# Patient Record
Sex: FEMALE | Race: WHITE
[De-identification: names, ages, dates, MRNs, and addresses within clinical notes are randomized per-mention and may not be internally consistent; named-entity substitution may affect disease eponyms.]

---

## 2018-06-07 ENCOUNTER — HOSPITAL ENCOUNTER (EMERGENCY)
Dept: HOSPITAL 74 - JER | Age: 43
Discharge: HOME | End: 2018-06-07
Payer: COMMERCIAL

## 2018-06-07 VITALS — DIASTOLIC BLOOD PRESSURE: 71 MMHG | SYSTOLIC BLOOD PRESSURE: 122 MMHG | HEART RATE: 78 BPM | TEMPERATURE: 98.3 F

## 2018-06-07 VITALS — BODY MASS INDEX: 26 KG/M2

## 2018-06-07 DIAGNOSIS — O26.891: Primary | ICD-10-CM

## 2018-06-07 DIAGNOSIS — Z3A.01: ICD-10-CM

## 2018-06-07 DIAGNOSIS — O20.8: ICD-10-CM

## 2018-06-07 LAB
ALBUMIN SERPL-MCNC: 4.1 G/DL (ref 3.4–5)
ALP SERPL-CCNC: 92 U/L (ref 45–117)
ALT SERPL-CCNC: 24 U/L (ref 12–78)
ANION GAP SERPL CALC-SCNC: 7 MMOL/L (ref 8–16)
APPEARANCE UR: (no result)
AST SERPL-CCNC: 15 U/L (ref 15–37)
BASOPHILS # BLD: 0.5 % (ref 0–2)
BILIRUB SERPL-MCNC: 0.3 MG/DL (ref 0.2–1)
BILIRUB UR STRIP.AUTO-MCNC: NEGATIVE MG/DL
BUN SERPL-MCNC: 10 MG/DL (ref 7–18)
CALCIUM SERPL-MCNC: 9.7 MG/DL (ref 8.5–10.1)
CHLORIDE SERPL-SCNC: 104 MMOL/L (ref 98–107)
CO2 SERPL-SCNC: 24 MMOL/L (ref 21–32)
COLOR UR: (no result)
CREAT SERPL-MCNC: 0.6 MG/DL (ref 0.55–1.02)
DEPRECATED RDW RBC AUTO: 13.8 % (ref 11.6–15.6)
EOSINOPHIL # BLD: 1.5 % (ref 0–4.5)
EPITH CASTS URNS QL MICRO: (no result) /HPF
GLUCOSE SERPL-MCNC: 87 MG/DL (ref 74–106)
HCT VFR BLD CALC: 36.9 % (ref 32.4–45.2)
HGB BLD-MCNC: 12.4 GM/DL (ref 10.7–15.3)
KETONES UR QL STRIP: NEGATIVE
LEUKOCYTE ESTERASE UR QL STRIP.AUTO: NEGATIVE
LYMPHOCYTES # BLD: 30.2 % (ref 8–40)
MCH RBC QN AUTO: 29.6 PG (ref 25.7–33.7)
MCHC RBC AUTO-ENTMCNC: 33.7 G/DL (ref 32–36)
MCV RBC: 88 FL (ref 80–96)
MONOCYTES # BLD AUTO: 6.2 % (ref 3.8–10.2)
NEUTROPHILS # BLD: 61.6 % (ref 42.8–82.8)
NITRITE UR QL STRIP: NEGATIVE
PH UR: 6 [PH] (ref 5–8)
PLATELET # BLD AUTO: 223 K/MM3 (ref 134–434)
PMV BLD: 9.1 FL (ref 7.5–11.1)
POTASSIUM SERPLBLD-SCNC: 4 MMOL/L (ref 3.5–5.1)
PROT SERPL-MCNC: 7.6 G/DL (ref 6.4–8.2)
PROT UR QL STRIP: NEGATIVE
PROT UR QL STRIP: NEGATIVE
RBC # BLD AUTO: 4.19 M/MM3 (ref 3.6–5.2)
RBC # UR STRIP: (no result) /UL
SODIUM SERPL-SCNC: 135 MMOL/L (ref 136–145)
SP GR UR: 1.01 (ref 1–1.03)
UROBILINOGEN UR STRIP-MCNC: NEGATIVE MG/DL (ref 0.2–1)
WBC # BLD AUTO: 5.6 K/MM3 (ref 4–10)

## 2018-06-07 NOTE — PDOC
History of Present Illness





- General


Chief Complaint: Vaginal Bleeding


Stated Complaint: BLEEDING (5 WKS PREGNANT)


Time Seen by Provider: 18 11:39





- History of Present Illness


Initial Comments: 





18 11:57


Ms. Hensley is a  44 yo female at approximately 5 weeks gestation (LMP 

) who presents for evaluation of a small amount of blood noted today 

while wiping. She had previously been evaluated 2 days ago at the [facility] 

[facility] clinic and was told to follow-up as needed for any blood or pain. She is 

unsure if they were able to confirm IUP. Denies any current pain however is 

nervous and would like to be evaluated.





The patient denies chest pain, shortness of breath, headache and dizziness. 

Denies fever, chills, nausea, vomit, diarrhea and constipation. Denies dysuria, 

frequency, urgency and hematuria. 


Allergies: NKDA





Past History





- Past Medical History


Allergies/Adverse Reactions: 


 Allergies











Allergy/AdvReac Type Severity Reaction Status Date / Time


 


SEAFOOD Allergy  Difficulty Uncoded 18 11:29





   Breathing  











Home Medications: 


Ambulatory Orders





Prenatal Vit No.130/Iron/Folic [Prenatal Vitamins] 1 each PO DAILY 17 








Asthma: No


Cancer: No


Cardiac Disorders: No


COPD: No


DVT: No


Diabetes: No


HTN: No


Seizures: No


Thyroid Disease: No





- Reproductive History


 (#): 3


Para: 1





- Suicide/Smoking/Psychosocial Hx


Smoking Status: No


Smoking History: Never smoked


Have you smoked in the past 12 months: No


Number of Cigarettes Smoked Daily: 0


Information on smoking cessation initiated: No


Hx Alcohol Use: No


Drug/Substance Use Hx: No


Substance Use Type: None


Hx Substance Use Treatment: No





**Review of Systems





- Review of Systems


Comments:: 





18 12:02


GENERAL/CONSTITUTIONAL: No fever or chills. No weakness.


HEAD, EYES, EARS, NOSE AND THROAT: No change in vision. No ear pain or 

discharge. No sore throat.


CARDIOVASCULAR: No chest pain or shortness of breath


RESPIRATORY: No cough, wheezing, or hemoptysis.


GASTROINTESTINAL: No nausea, vomiting, diarrhea or constipation.


GENITOURINARY: No dysuria, frequency, or change in urination.


MUSCULOSKELETAL: No joint or muscle swelling or pain. No neck or back pain.


SKIN: No rash


NEUROLOGIC: No headache, vertigo, loss of consciousness, or change in strength/

sensation.


ENDOCRINE: No increased thirst. No abnormal weight change


HEMATOLOGIC/LYMPHATIC: No anemia, easy bleeding, or history of blood clots.


ALLERGIC/IMMUNOLOGIC: No hives or skin allergy.





*Physical Exam





- Vital Signs


 Last Vital Signs











Temp Pulse Resp BP Pulse Ox


 


 98.6 F   79   18   125/51   100 


 


 18 11:29  18 11:29  18 11:29  18 11:29  18 11:29














- Physical Exam


Comments: 





18 12:02


GENERAL: Awake, alert, and fully oriented, in no acute distress


HEAD: No signs of trauma, normocephalic, atraumatic 


EYES: PERRLA, EOMI, sclera anicteric, conjunctiva clear


ENT: Auricles normal inspection, hearing grossly normal, nares patent, 

oropharynx clear without


exudates. Moist mucosa


NECK: Normal ROM, supple, no lymphadenopathy, JVD, or masses


LUNGS: No distress, speaks full sentences, clear to auscultation bilaterally 


HEART: Regular rate and rhythm, normal S1 and S2, no murmurs, rubs or gallops, 

peripheral pulses normal and equal bilaterally. 


ABDOMEN: Soft, nontender, normoactive bowel sounds. No guarding, no rebound. No 

masses


EXTREMITIES: Normal inspection, Normal range of motion, no edema. No clubbing 

or cyanosis. 


NEUROLOGICAL: Cranial nerves II through XII grossly intact. Normal speech, 

normal gait, no focal sensorimotor deficits 


SKIN: Warm, Dry, normal turgor, no rashes or lesions noted. 


: No CMT, no adnexal tenderness. Os closed, no blood noted.








ED Treatment Course





- LABORATORY


CBC & Chemistry Diagram: 


 18 12:05





 18 12:05





Medical Decision Making





- Medical Decision Making





18 15:11


Ms. Hensley is a 44 yo female w/ pmh as described who presents for evaluation 

of spotting noted in pregnancy. Patient exam non-concerning as above. 

Transvaginal US sent to evaluate for IUP. 





18 15:12


US noted irregular intrauterine gestational sac containing probably fetl pole w/

out definite yolk sac. Unable to identify fetal heart rate. Findings suspicious 

for failed early pregnancy. Re-evaluation recommended in 7-13 days. Beta as 

below. Patient Rh + on blood type so no rhogam administered. Discharging 

patient w/ instructions to f/u with OB/GYN for further evaluation. Patient 

verbalized understanding and agreement and will comply.








 Laboratory Results - last 24 hr











  18





  12:05 12:05 12:05


 


WBC  5.6  D  


 


RBC  4.19  D  


 


Hgb  12.4  D  


 


Hct  36.9  D  


 


MCV  88.0  


 


MCH  29.6  


 


MCHC  33.7  


 


RDW  13.8  D  


 


Plt Count  223  D  


 


MPV  9.1  


 


Absolute Neuts (auto)  3.5  


 


Neutrophils %  61.6  D  


 


Lymphocytes %  30.2  D  


 


Monocytes %  6.2  


 


Eosinophils %  1.5  D  


 


Basophils %  0.5  


 


Nucleated RBC %  0  


 


Sodium   135 L 


 


Potassium   4.0 


 


Chloride   104 


 


Carbon Dioxide   24 


 


Anion Gap   7 L 


 


BUN   10 


 


Creatinine   0.6 


 


Creat Clearance w eGFR   > 60 


 


Random Glucose   87 


 


Calcium   9.7 


 


Total Bilirubin   0.3  D 


 


AST   15 


 


ALT   24 


 


Alkaline Phosphatase   92 


 


Total Protein   7.6 


 


Albumin   4.1 


 


Beta HCG, Quant   6875.7 


 


Urine Color   


 


Urine Appearance   


 


Urine pH   


 


Ur Specific Gravity   


 


Urine Protein   


 


Urine Glucose (UA)   


 


Urine Ketones   


 


Urine Blood   


 


Urine Nitrite   


 


Urine Bilirubin   


 


Urine Urobilinogen   


 


Ur Leukocyte Esterase   


 


Urine WBC (Auto)   


 


Urine RBC (Auto)   


 


Ur Epithelial Cells   


 


Blood Type    A POSITIVE


 


Antibody Screen    Negative














  18





  12:30


 


WBC 


 


RBC 


 


Hgb 


 


Hct 


 


MCV 


 


MCH 


 


MCHC 


 


RDW 


 


Plt Count 


 


MPV 


 


Absolute Neuts (auto) 


 


Neutrophils % 


 


Lymphocytes % 


 


Monocytes % 


 


Eosinophils % 


 


Basophils % 


 


Nucleated RBC % 


 


Sodium 


 


Potassium 


 


Chloride 


 


Carbon Dioxide 


 


Anion Gap 


 


BUN 


 


Creatinine 


 


Creat Clearance w eGFR 


 


Random Glucose 


 


Calcium 


 


Total Bilirubin 


 


AST 


 


ALT 


 


Alkaline Phosphatase 


 


Total Protein 


 


Albumin 


 


Beta HCG, Quant 


 


Urine Color  Ltyellow


 


Urine Appearance  Slcloudy


 


Urine pH  6.0


 


Ur Specific Gravity  1.009


 


Urine Protein  Negative


 


Urine Glucose (UA)  Negative


 


Urine Ketones  Negative


 


Urine Blood  2+ H


 


Urine Nitrite  Negative


 


Urine Bilirubin  Negative


 


Urine Urobilinogen  Negative


 


Ur Leukocyte Esterase  Negative


 


Urine WBC (Auto)  1


 


Urine RBC (Auto)  1


 


Ur Epithelial Cells  Rare


 


Blood Type 


 


Antibody Screen 














*DC/Admit/Observation/Transfer


Diagnosis at time of Disposition: 


 Bleeding in early pregnancy








- Discharge Dispostion


Disposition: HOME





- Referrals


Referrals: 


Oswaldo Betts MD [Primary Care Provider] - 


Madison Ramos DO [Staff Physician] - 





- Patient Instructions


Printed Discharge Instructions:  [facility] for Vaginal Bleeding During Pregnancy


Additional Instructions: 


Please follow-up with OB/GYN as discussed. Return to ER if any increase in 

bleeding, pain, fever, discharge, or other concerning symptoms.





- Post Discharge Activity

## 2018-06-07 NOTE — PDOC
Attending Attestation





- Resident


Resident Name: Hayden Olsen





- ED Attending Attestation


I have performed the following: I have examined & evaluated the patient, The 

case was reviewed & discussed with the resident, I agree w/resident's findings 

& plan, Exceptions are as noted





- HPI


HPI: 





18 13:07


43y F  no other pmhx presents with complaint of vaginal spotting, currently 

at 5 weeks gestatioin without abdominal pain, f/c, n/v. back pain





exam noted for soft nontender abdomen





will obtain Beta, tvus











- Physicial Exam


PE: 





18 15:10


see above





- Medical Decision Making





18 15:09


beta at 6k


US noted for gestational sack with probable fetal pole with no definite yolk 

sack or fetal heart rate. possible aerly pregnancy


will have pt fu with dr. serrano

## 2018-06-09 ENCOUNTER — HOSPITAL ENCOUNTER (EMERGENCY)
Dept: HOSPITAL 74 - JER | Age: 43
Discharge: HOME | End: 2018-06-09
Payer: COMMERCIAL

## 2018-06-09 VITALS — TEMPERATURE: 98 F

## 2018-06-09 VITALS — SYSTOLIC BLOOD PRESSURE: 121 MMHG | DIASTOLIC BLOOD PRESSURE: 73 MMHG | HEART RATE: 84 BPM

## 2018-06-09 VITALS — BODY MASS INDEX: 26 KG/M2

## 2018-06-09 DIAGNOSIS — O20.0: ICD-10-CM

## 2018-06-09 DIAGNOSIS — O26.891: Primary | ICD-10-CM

## 2018-06-09 DIAGNOSIS — Z3A.00: ICD-10-CM

## 2018-06-09 LAB
ALBUMIN SERPL-MCNC: 4 G/DL (ref 3.4–5)
ALP SERPL-CCNC: 88 U/L (ref 45–117)
ALT SERPL-CCNC: 24 U/L (ref 12–78)
ANION GAP SERPL CALC-SCNC: 8 MMOL/L (ref 8–16)
APPEARANCE UR: (no result)
AST SERPL-CCNC: 16 U/L (ref 15–37)
BASOPHILS # BLD: 0.5 % (ref 0–2)
BILIRUB SERPL-MCNC: 0.3 MG/DL (ref 0.2–1)
BILIRUB UR STRIP.AUTO-MCNC: NEGATIVE MG/DL
BUN SERPL-MCNC: 11 MG/DL (ref 7–18)
CALCIUM SERPL-MCNC: 9.4 MG/DL (ref 8.5–10.1)
CHLORIDE SERPL-SCNC: 104 MMOL/L (ref 98–107)
CO2 SERPL-SCNC: 25 MMOL/L (ref 21–32)
COLOR UR: (no result)
CREAT SERPL-MCNC: 0.8 MG/DL (ref 0.55–1.02)
DEPRECATED RDW RBC AUTO: 13.4 % (ref 11.6–15.6)
EOSINOPHIL # BLD: 1.3 % (ref 0–4.5)
EPITH CASTS URNS QL MICRO: (no result) /HPF
GLUCOSE SERPL-MCNC: 92 MG/DL (ref 74–106)
HCG UR QL: POSITIVE
HCT VFR BLD CALC: 37 % (ref 32.4–45.2)
HGB BLD-MCNC: 12.4 GM/DL (ref 10.7–15.3)
KETONES UR QL STRIP: NEGATIVE
LEUKOCYTE ESTERASE UR QL STRIP.AUTO: NEGATIVE
LYMPHOCYTES # BLD: 24.7 % (ref 8–40)
MCH RBC QN AUTO: 29.7 PG (ref 25.7–33.7)
MCHC RBC AUTO-ENTMCNC: 33.6 G/DL (ref 32–36)
MCV RBC: 88.2 FL (ref 80–96)
MONOCYTES # BLD AUTO: 6.2 % (ref 3.8–10.2)
MUCOUS THREADS URNS QL MICRO: (no result)
NEUTROPHILS # BLD: 67.3 % (ref 42.8–82.8)
NITRITE UR QL STRIP: NEGATIVE
PH UR: 6 [PH] (ref 5–8)
PLATELET # BLD AUTO: 213 K/MM3 (ref 134–434)
PMV BLD: 9.4 FL (ref 7.5–11.1)
POTASSIUM SERPLBLD-SCNC: 4.1 MMOL/L (ref 3.5–5.1)
PROT SERPL-MCNC: 7.6 G/DL (ref 6.4–8.2)
PROT UR QL STRIP: (no result)
PROT UR QL STRIP: NEGATIVE
RBC # BLD AUTO: 4.19 M/MM3 (ref 3.6–5.2)
RBC # UR STRIP: (no result) /UL
SODIUM SERPL-SCNC: 137 MMOL/L (ref 136–145)
SP GR UR: 1.01 (ref 1–1.03)
UROBILINOGEN UR STRIP-MCNC: NEGATIVE MG/DL (ref 0.2–1)
WBC # BLD AUTO: 7.4 K/MM3 (ref 4–10)

## 2018-06-09 PROCEDURE — 3E0337Z INTRODUCTION OF ELECTROLYTIC AND WATER BALANCE SUBSTANCE INTO PERIPHERAL VEIN, PERCUTANEOUS APPROACH: ICD-10-PCS

## 2018-06-09 NOTE — PDOC
*Physical Exam





- Vital Signs


 Last Vital Signs











Temp Pulse Resp BP Pulse Ox


 


 98 F   83   18   122/56   99 


 


 06/09/18 11:00  06/09/18 11:00  06/09/18 11:00  06/09/18 11:00  06/09/18 11:00














ED Treatment Course





- LABORATORY


CBC & Chemistry Diagram: 


 06/09/18 11:27





 06/09/18 12:05





Medical Decision Making





- Medical Decision Making





06/09/18 11:56


42 yo F p/w lower abd pain and bleeding 


Recent US demonstrates no IUP, bHCG 6000. 


Patient states was told to return in 2 days. 





Pending repeat labs


 Laboratory Tests











  06/09/18





  12:05


 


Beta HCG, Quant  3663.3











And US





Pt seen by Midlevel Provider under my direct supervision


Pt interviewed and examined


Ancillary studies reviewed





I agree with plan as outlined by Midlevel Provider


06/09/18 15:59





06/09/18 16:05








*DC/Admit/Observation/Transfer





- Referrals


Referrals: 


Oswaldo Betts MD [Primary Care Provider] - 





- Patient Instructions





- Post Discharge Activity

## 2018-06-09 NOTE — PDOC
History of Present Illness





- General


Chief Complaint: Vaginal Bleeding


Stated Complaint: VAGINAL BLEEDING, PAIN


Time Seen by Provider: 18 11:08


History Source: Patient


Exam Limitations: No Limitations





- History of Present Illness


Travel History: No


Initial Comments: 





18 13:00


43-year-old female presents to the ED with complaints of lower abdominal pain 

along with vaginal bleeding which has increased since yesterday. Patient was 

seen here 2 days ago and very to confirmation of pregnancy as per request from 

patient's GYN woman to woman. Patient had an ultrasound which showed no IUP and 

had a beta count of 6000. Patient states was told to return in 2 days. Patient 

states no low back pain passage of large clots, urinary complaints, or fever.


Timing/Duration: reports: getting worse


Quality: reports: mild, cramping


Abdominal Pain Onset Location: reports: suprapubic


Pain Radiation: reports: no radiation


Activities at Onset: reports: none


Aggravating Factors: improves with: None


Alleviating Factors: improves with: None





Past History





- Travel


Traveled outside of the country in the last 30 days: No





- Past Medical History


Allergies/Adverse Reactions: 


 Allergies











Allergy/AdvReac Type Severity Reaction Status Date / Time


 


No Known Drug Allergies Allergy   Verified 18 14:24


 


SEAFOOD Allergy  Difficulty Uncoded 18 14:11





   Breathing  











Home Medications: 


Ambulatory Orders





Prenatal Vit No.130/Iron/Folic [Prenatal Vitamins] 1 each PO DAILY 17 


Acetaminophen [Tylenol -] 1,000 mg PO TID PRN #30 tablet 18 








Asthma: No


Cancer: No


Cardiac Disorders: No


COPD: No


DVT: No


Diabetes: No


HTN: No


Seizures: No


Thyroid Disease: No





- Reproductive History


 (#): 3


Para: 1


Spontaneous : 0





- Suicide/Smoking/Psychosocial Hx


Smoking Status: No


Smoking History: Never smoked


Have you smoked in the past 12 months: No


Number of Cigarettes Smoked Daily: 0


Hx Alcohol Use: No


Drug/Substance Use Hx: No


Substance Use Type: None


Hx Substance Use Treatment: No


Patient Lives Alone: No


Lives with/in: spouse/SO





**Review of Systems





- Review of Systems


Able to Perform ROS?: No


Constitutional: No: Symptoms Reported


HEENTM: No: Symptoms Reported


Respiratory: No: Symptoms reported


Cardiac (ROS): No: Symptoms Reported


ABD/GI: Yes: Abdominal cramping


: Yes: Discharge


Musculoskeletal: No: Symptoms Reported


Integumentary: No: Symptoms Reported


Neurological: No: Symptoms reported


Endocrine: No: Symptoms Reported


Hematologic/Lymphatic: No: Symptoms Reported





*Physical Exam





- Vital Signs


 Last Vital Signs











Temp Pulse Resp BP Pulse Ox


 


 98 F   83   18   122/56   99 


 


 18 11:00  18 11:00  18 11:00  18 11:00  18 11:00














- Physical Exam


General Appearance: Yes: Nourished, Appropriately Dressed.  No: Apparent 

Distress


HEENT: negative: Pale Conjunctivae


Neck: positive: Normal Thyroid, Supple


Respiratory/Chest: positive: Lungs Clear, Normal Breath Sounds.  negative: 

Respiratory Distress, Accessory Muscle Use


Cardiovascular: positive: Regular Rhythm, Regular Rate.  negative: Murmur


Female Pelvic Exam: positive: cervical os closed, vaginal bleeding (bright red 

moderate amount)


Gastrointestinal/Abdominal: positive: Normal Bowel Sounds, Soft, Tenderness (

suprapubic).  negative: Distended


Extremity: positive: Normal Capillary Refill.  negative: Pedal Edema


Integumentary: positive: Normal Color, Warm, Moist


Neurologic: positive: Motor Strength 5/5 (ambulatory)





ED Treatment Course





- LABORATORY


CBC & Chemistry Diagram: 


 18 11:27





 18 12:05





- ADDITIONAL ORDERS


Additional order review: 


 Laboratory  Results











  18





  12:05 11:49 11:27


 


Sodium  137  


 


Potassium  4.1  


 


Chloride  104  


 


Carbon Dioxide  25  


 


Anion Gap  8  


 


BUN  11  


 


Creatinine  0.8  


 


Creat Clearance w eGFR  > 60  


 


Random Glucose  92  


 


Calcium  9.4  


 


Total Bilirubin  0.3  


 


AST  16  


 


ALT  24  


 


Alkaline Phosphatase  88  


 


Total Protein  7.6  


 


Albumin  4.0  


 


Beta HCG, Quant   Cancelled 


 


Urine Color    Red


 


Urine Appearance    Slcloudy


 


Urine pH    6.0


 


Ur Specific Gravity    1.013


 


Urine Protein    2+ H


 


Urine Glucose (UA)    Negative


 


Urine Ketones    Negative


 


Urine Blood    3+ H


 


Urine Nitrite    Negative


 


Urine Bilirubin    Negative


 


Urine Urobilinogen    Negative


 


Ur Leukocyte Esterase    Negative


 


Urine WBC (Auto)    None


 


Urine RBC (Auto)    990


 


Ur Epithelial Cells    Rare


 


Urine Mucus    Rare


 


Urine HCG, Qual    Positive








 











  18





  11:27


 


RBC  4.19


 


MCV  88.2


 


MCHC  33.6


 


RDW  13.4


 


MPV  9.4


 


Neutrophils %  67.3


 


Lymphocytes %  24.7


 


Monocytes %  6.2


 


Eosinophils %  1.3


 


Basophils %  0.5














- RADIOLOGY


Radiology Studies Ordered: 














 Category Date Time Status


 


 TRANSVAGINAL US PREG [US] Stat Ultrasound  18 11:49 Ordered














- Medications


Given in the ED: 


ED Medications














Discontinued Medications














Generic Name Dose Route Start Last Admin





  Trade Name Freq  PRN Reason Stop Dose Admin


 


Ibuprofen  600 mg  18 11:28  18 12:00





  Motrin -  PO  18 11:29  600 mg





  ONCE ONE   Administration





     





     





     





     














Medical Decision Making





- Medical Decision Making





18 13:00


Patient complains increasing vaginal bleeding abdominal and suprapubic pain. 

Patient states was here 2 days ago was told to return in 2 days. States 

symptoms worsen including pain along with bleeding. Patient ordered labs, beta 

hCG, Tylenol and ultrasound.


18 13:31


 Laboratory Tests











  18





  12:05 11:27 11:27


 


WBC   7.4  D 


 


Hgb  12.4  D  12.4 


 


Hct   37.0 


 


Sodium   


 


Random Glucose   


 


AST   


 


ALT   


 


Beta HCG, Quant   


 


Urine Protein    2+ H


 


Urine Blood    3+ H


 


Urine Nitrite    Negative


 


Ur Leukocyte Esterase    Negative


 


Urine RBC (Auto)    990


 


Urine HCG, Qual    Positive














  18





  12:05


 


WBC 


 


Hgb 


 


Hct 


 


Sodium  137


 


Random Glucose  92


 


AST  16


 


ALT  24


 


Beta HCG, Quant  Pending


 


Urine Protein 


 


Urine Blood 


 


Urine Nitrite 


 


Ur Leukocyte Esterase 


 


Urine RBC (Auto) 


 


Urine HCG, Qual 











18 14:20


 Laboratory Tests











  18





  12:05


 


Beta HCG, Quant  3663.3








 Laboratory Tests











  18





  12:05


 


Beta HCG, Quant  6875.7














Ultrasound shows irregular intrauterine fluid filled sac with no definite fetal 

pole yolk sac appears smaller since prior exam and may be blood. The 

constellation of findings suggestive of early failed early pregnancy and 

miscarriage. Clinical correlation and clinical follow-up is recommended. 

Patient is to follow-up with  GYN woman to woman.














*DC/Admit/Observation/Transfer


Diagnosis at time of Disposition: 


 Threatened 








- Discharge Dispostion


Disposition: HOME


Condition at time of disposition: Good





- Prescriptions


Prescriptions: 


Acetaminophen [Tylenol -] 1,000 mg PO TID PRN #30 tablet


 PRN Reason: Pain





- Referrals


Referrals: 


Oswaldo Betts MD [Primary Care Provider] - 


Lizeth Jin MD [Staff Physician] - 





- Patient Instructions


Printed Discharge Instructions:  DI for Threatened 


Additional Instructions: 


Please follow-up with your GYN.


May take Tylenol for discomfort. 


If you develop severe abdominal pain or heavy bleeding please return to the ED..








- Post Discharge Activity

## 2018-08-24 ENCOUNTER — HOSPITAL ENCOUNTER (EMERGENCY)
Dept: HOSPITAL 74 - JERFT | Age: 43
Discharge: HOME | End: 2018-08-24
Payer: COMMERCIAL

## 2018-08-24 VITALS — DIASTOLIC BLOOD PRESSURE: 81 MMHG | HEART RATE: 75 BPM | TEMPERATURE: 98.3 F | SYSTOLIC BLOOD PRESSURE: 130 MMHG

## 2018-08-24 VITALS — BODY MASS INDEX: 25.8 KG/M2
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
## 2018-08-24 DIAGNOSIS — S93.401A: Primary | ICD-10-CM
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
## 2018-08-24 NOTE — PDOC
History of Present Illness





- General


Chief Complaint: Injury


Stated Complaint: INJURY


Time Seen by Provider: 18 12:58


History Source: Patient


Exam Limitations: No Limitations





- History of Present Illness


Initial Comments: 





18 13:20


43 year old with no past medical history, surgical history of  x 1, 

presents with injury to right ankle after trip and fall today while walking on 

the street.


Denies headstrike, dizziness before and after fall. 


Occurred: reports: just prior to arrival


Severity: reports: moderate


Pain Location: reports: lower extremity


Method of Injury: Yes: fall


Modifying Factors: improves with: immobilization, rest


Loss of Consciousness: no loss of consciousness


Associated Symptoms (Fall): denies symptoms





Past History





- Travel


Traveled outside of the country in the last 30 days: No


Close contact w/someone who was outside of country & ill: No





- Past Medical History


Allergies/Adverse Reactions: 


 Allergies











Allergy/AdvReac Type Severity Reaction Status Date / Time


 


No Known Drug Allergies Allergy   Verified 18 11:58


 


SEAFOOD Allergy  Difficulty Uncoded 18 11:58





   Breathing  











Home Medications: 


Ambulatory Orders





Ibuprofen [Advil -] 600 mg PO TID #21 tablet 18 








Asthma: No


Cancer: No


Cardiac Disorders: No


COPD: No


DVT: No


Diabetes: No


HTN: No


Seizures: No


Thyroid Disease: No





- Reproductive History


 (#): 3


Para: 1


Therapeutic (s) & number: No


Spontaneous : 0





- Suicide/Smoking/Psychosocial Hx


Smoking Status: No


Smoking History: Never smoked


Have you smoked in the past 12 months: No


Number of Cigarettes Smoked Daily: 0


Hx Alcohol Use: No


Drug/Substance Use Hx: No


Substance Use Type: None


Hx Substance Use Treatment: No





Trauma Specific PMHX





- Complaint Specific PMHX


Arthritis: No


Back Injury: No


Neck Injury: No





**Review of Systems





- Review of Systems


Able to Perform ROS?: Yes


Is the patient limited English proficient: No


Constitutional: No: Chills, Fever, Night Sweats, Weakness


Respiratory: Yes: Cough, Orthopnea, Shortness of Breath


Cardiac (ROS): No: Lightheadedness, Palpitations


ABD/GI: No: Poor Appetite, Indigestion


: No: Burning, Hematuria, Incontinence


Musculoskeletal: Yes: Other (right ankle swelling and pain)


Integumentary: No: Bruising, Erythema, Flushing


Neurological: No: Headache, Numbness, Paresthesia





*Physical Exam





- Vital Signs


 Last Vital Signs











Temp Pulse Resp BP Pulse Ox


 


 98.3 F   75   18   130/81   98 


 


 18 11:55  18 11:55  18 11:55  18 11:55  18 11:55














- Physical Exam


General Appearance: Yes: Nourished, Appropriately Dressed


HEENT: positive: EOMI, VIDYA, TMs Normal, Pharynx Normal


Neck: positive: Supple.  negative: Lymphadenopathy (R), Lymphadenopathy (L)


Respiratory/Chest: positive: Lungs Clear, Normal Breath Sounds


Cardiovascular: positive: Regular Rhythm, Regular Rate, S1, S2


Extremity: positive: Normal Capillary Refill, Normal Inspection, Other (right 

lateral malleoulus with swelling and tenderness )


Integumentary: positive: Swelling.  negative: Bruising


Neurologic: positive: CNs II-XII NML intact, Fully Oriented, Alert





Medical Decision Making





- Medical Decision Making





18 13:27


43 year old female with swelling and pain to right ankle after fall today 





xray and analgesia ordered 


18 15:01


wet read by me negative for fracture, ace wrap, cast shoe and crutches, 


follow up with orthopedic 


18 19:19


official read by Dr Arndt negative for fracture, no other intervention needed 





*DC/Admit/Observation/Transfer


Diagnosis at time of Disposition: 


Right ankle sprain


Qualifiers:


 Encounter type: initial encounter Involved ligament of ankle: unspecified 

ligament Qualified Code(s): S93.401A - Sprain of unspecified ligament of right 

ankle, initial encounter








- Discharge Dispostion


Disposition: HOME


Condition at time of disposition: Good


Decision to Admit order: No





- Prescriptions


Prescriptions: 


Ibuprofen [Advil -] 600 mg PO TID #21 tablet





- Referrals


Referrals: 


Oswaldo Betts MD [Primary Care Provider] - 


Khris Garcia MD [Staff Physician] - 





- Patient Instructions


Printed Discharge Instructions:  DI for Ankle Sprain


Additional Instructions: 


Activity as tolerated. 


Use ace wrap when up and about, remove for sleep


Please elevate at rest, apply ice compress for 20 minutes 3 to 4 times daily


Call ortho for follow up appointment 





- Post Discharge Activity


Forms/Work/School Notes:  Back to Work

## 2022-05-06 ENCOUNTER — HOSPITAL ENCOUNTER (OUTPATIENT)
Dept: HOSPITAL 74 - JASU-SURG | Age: 47
Discharge: HOME | End: 2022-05-06
Attending: STUDENT IN AN ORGANIZED HEALTH CARE EDUCATION/TRAINING PROGRAM
Payer: COMMERCIAL

## 2022-05-06 VITALS — TEMPERATURE: 98 F | DIASTOLIC BLOOD PRESSURE: 73 MMHG | HEART RATE: 72 BPM | SYSTOLIC BLOOD PRESSURE: 118 MMHG

## 2022-05-06 VITALS — BODY MASS INDEX: 27.6 KG/M2

## 2022-05-06 DIAGNOSIS — M54.12: Primary | ICD-10-CM

## 2022-05-06 PROCEDURE — 3E0R3BZ INTRODUCTION OF ANESTHETIC AGENT INTO SPINAL CANAL, PERCUTANEOUS APPROACH: ICD-10-PCS | Performed by: STUDENT IN AN ORGANIZED HEALTH CARE EDUCATION/TRAINING PROGRAM

## 2022-05-06 PROCEDURE — 3E0R33Z INTRODUCTION OF ANTI-INFLAMMATORY INTO SPINAL CANAL, PERCUTANEOUS APPROACH: ICD-10-PCS | Performed by: STUDENT IN AN ORGANIZED HEALTH CARE EDUCATION/TRAINING PROGRAM

## 2022-10-03 ENCOUNTER — HOSPITAL ENCOUNTER (OUTPATIENT)
Dept: HOSPITAL 74 - JASU-SURG | Age: 47
Discharge: HOME | End: 2022-10-03
Attending: OBSTETRICS & GYNECOLOGY
Payer: COMMERCIAL

## 2022-10-03 VITALS — DIASTOLIC BLOOD PRESSURE: 70 MMHG | SYSTOLIC BLOOD PRESSURE: 110 MMHG | HEART RATE: 62 BPM

## 2022-10-03 VITALS — TEMPERATURE: 97.5 F | RESPIRATION RATE: 20 BRPM

## 2022-10-03 VITALS — BODY MASS INDEX: 25.7 KG/M2

## 2022-10-03 DIAGNOSIS — N84.0: ICD-10-CM

## 2022-10-03 DIAGNOSIS — D25.0: Primary | ICD-10-CM

## 2022-10-03 PROCEDURE — 0UB98ZZ EXCISION OF UTERUS, VIA NATURAL OR ARTIFICIAL OPENING ENDOSCOPIC: ICD-10-PCS | Performed by: OBSTETRICS & GYNECOLOGY

## 2022-10-03 PROCEDURE — 0UDB7ZX EXTRACTION OF ENDOMETRIUM, VIA NATURAL OR ARTIFICIAL OPENING, DIAGNOSTIC: ICD-10-PCS | Performed by: OBSTETRICS & GYNECOLOGY

## 2022-10-03 PROCEDURE — 0UJD8ZZ INSPECTION OF UTERUS AND CERVIX, VIA NATURAL OR ARTIFICIAL OPENING ENDOSCOPIC: ICD-10-PCS | Performed by: OBSTETRICS & GYNECOLOGY
